# Patient Record
Sex: MALE | Race: WHITE | Employment: FULL TIME | URBAN - METROPOLITAN AREA
[De-identification: names, ages, dates, MRNs, and addresses within clinical notes are randomized per-mention and may not be internally consistent; named-entity substitution may affect disease eponyms.]

---

## 2023-08-04 ENCOUNTER — OFFICE VISIT (OUTPATIENT)
Dept: ORTHOPEDIC SURGERY | Age: 38
End: 2023-08-04

## 2023-08-04 VITALS — HEIGHT: 70 IN | WEIGHT: 230 LBS | BODY MASS INDEX: 32.93 KG/M2

## 2023-08-04 DIAGNOSIS — M25.521 RIGHT ELBOW PAIN: Primary | ICD-10-CM

## 2023-08-04 RX ORDER — DICLOFENAC SODIUM 75 MG/1
75 TABLET, DELAYED RELEASE ORAL 2 TIMES DAILY
Qty: 60 TABLET | Refills: 2 | Status: SHIPPED | OUTPATIENT
Start: 2023-08-04

## 2023-08-21 ENCOUNTER — OFFICE VISIT (OUTPATIENT)
Dept: ORTHOPEDIC SURGERY | Age: 38
End: 2023-08-21

## 2023-08-21 VITALS — HEIGHT: 71 IN | WEIGHT: 235 LBS | BODY MASS INDEX: 32.9 KG/M2

## 2023-08-21 DIAGNOSIS — S53.441D SPRAIN OF ULNAR COLLATERAL LIGAMENT OF RIGHT ELBOW, SUBSEQUENT ENCOUNTER: Primary | ICD-10-CM

## 2023-08-21 RX ORDER — METHYLPREDNISOLONE ACETATE 40 MG/ML
40 INJECTION, SUSPENSION INTRA-ARTICULAR; INTRALESIONAL; INTRAMUSCULAR; SOFT TISSUE ONCE
Status: SHIPPED | OUTPATIENT
Start: 2023-08-21

## 2023-08-21 NOTE — PROGRESS NOTES
Follow-up (Right elbow. Mri results)      History of Present Illness: Alyx Herr is a 41 y.o. male here for follow-up regarding his right elbow. As a review early June he threw a softball overhead felt a pop in the medial aspect of his elbow and he has had pain ever since. Describes pain that is focal to the medial elbow worse with any type of overhead throwing. This has not gotten any better over the past 2 months. Medical History:  Patient's medications, allergies, past medical, surgical, social and family histories were reviewed and updated as appropriate. ROS: Review of systems reviewed from Patient History Form completed today and available in the patient's chart under the Media tab. Pertinent items are noted in HPI  Review of systems reviewed from Patient History Form completed today and available in the patient's chart under the Media tab. Vital Signs: There were no vitals filed for this visit. RIGHT Elbow Examination:     Inspection:    Normal alignment. Mild swelling. Palpation:     Tenderness over ulnar collateral ligament. Range of Motion:      0-135 degrees. Strength:       5/5 in all muscle groups. Instability testing:  Pain with valgus stress. No instability. Vascular exam:    Skin warm and well-perfused. Neurologic exam:     Sensation intact to light touch. Special Testing:          Positive milking maneuver. LEFT Elbow Comparison Examination:     Inspection:    Normal alignment. No swelling. Palpation:     No tenderness to palpation. Range of Motion:      0-135 degrees. Strength:       5/5 in all muscle groups. Instability testing:  Stable to varus and valgus stress. Vascular exam:    Skin warm and well-perfused. Neurologic exam:     Sensation intact to light touch. Radiology:       Pertinent imaging was interpreted and reviewed today, both images and report.      Right elbow MRI on

## 2023-09-18 ENCOUNTER — OFFICE VISIT (OUTPATIENT)
Dept: ORTHOPEDIC SURGERY | Age: 38
End: 2023-09-18
Payer: COMMERCIAL

## 2023-09-18 DIAGNOSIS — S53.441D SPRAIN OF ULNAR COLLATERAL LIGAMENT OF RIGHT ELBOW, SUBSEQUENT ENCOUNTER: Primary | ICD-10-CM

## 2023-09-18 PROCEDURE — 99213 OFFICE O/P EST LOW 20 MIN: CPT | Performed by: PHYSICIAN ASSISTANT

## 2023-09-18 NOTE — PROGRESS NOTES
Chief Complaint  Follow-up (Right elbow)      History of Present Illness: Rolando Mccullough is a pleasant 41 y.o. male here for follow regarding his right elbow. As a review, we have been treating him for a UCL sprain/wrist flexor sprain. At his last visit he underwent a corticosteroid injection. He experienced significant robust relief. He is also working on some home therapy exercises for this. Medical History:  Patient's medications, allergies, past medical, surgical, social and family histories were reviewed and updated as appropriate. ROS: Review of systems reviewed from Patient History Form completed today and available in the patient's chart under the Media tab. Pertinent items are noted in HPI  Review of systems reviewed from Patient History Form completed today and available in the patient's chart under the Media tab. Vital Signs: There were no vitals taken for this visit. Right elbow examination:    Inspection:    Normal alignment. No swelling. Palpation:     No tenderness to palpation    Range of Motion:      0-135 degrees. Strength:       5/5 in all muscle groups    Instability testing:  Stable to varus and valgus stress    Vascular exam:    Skin warm and well-perfused. Neurologic exam:     Sensation intact to light    Left elbow comparison examination:    Inspection:    Normal alignment. No swelling. Palpation:     No tenderness to palpation    Range of Motion:      0-135 degrees. Strength:       5/5 in all muscle groups    Instability testing:  Stable to varus and valgus stress    Vascular exam:    Skin warm and well-perfused. Neurologic exam:     Sensation intact to light       Radiology:       Pertinent imaging was interpreted and reviewed with the patient today, both images and report. Right elbow MRI on 08/15/2023  CONCLUSION:   1. Common flexor tendon origin tendinopathy, peritendinitis and partial-thickness tearing.  Tear    measures